# Patient Record
Sex: MALE | Race: BLACK OR AFRICAN AMERICAN | NOT HISPANIC OR LATINO | Employment: OTHER | URBAN - METROPOLITAN AREA
[De-identification: names, ages, dates, MRNs, and addresses within clinical notes are randomized per-mention and may not be internally consistent; named-entity substitution may affect disease eponyms.]

---

## 2019-03-28 ENCOUNTER — HOSPITAL ENCOUNTER (EMERGENCY)
Facility: HOSPITAL | Age: 39
Discharge: HOME | End: 2019-03-28
Attending: EMERGENCY MEDICINE
Payer: MEDICAID

## 2019-03-28 VITALS
HEART RATE: 96 BPM | TEMPERATURE: 99 F | BODY MASS INDEX: 34.1 KG/M2 | DIASTOLIC BLOOD PRESSURE: 101 MMHG | SYSTOLIC BLOOD PRESSURE: 184 MMHG | WEIGHT: 280 LBS | HEIGHT: 76 IN | RESPIRATION RATE: 18 BRPM | OXYGEN SATURATION: 98 %

## 2019-03-28 DIAGNOSIS — J01.90 ACUTE SINUSITIS, RECURRENCE NOT SPECIFIED, UNSPECIFIED LOCATION: Primary | ICD-10-CM

## 2019-03-28 DIAGNOSIS — H93.8X3 EAR PRESSURE, BILATERAL: ICD-10-CM

## 2019-03-28 DIAGNOSIS — R03.0 ELEVATED BLOOD-PRESSURE READING WITHOUT DIAGNOSIS OF HYPERTENSION: ICD-10-CM

## 2019-03-28 DIAGNOSIS — R51.9 SINUS HEADACHE: ICD-10-CM

## 2019-03-28 PROCEDURE — 99284 EMERGENCY DEPT VISIT MOD MDM: CPT | Mod: ,,, | Performed by: PHYSICIAN ASSISTANT

## 2019-03-28 PROCEDURE — 99284 EMERGENCY DEPT VISIT MOD MDM: CPT

## 2019-03-28 PROCEDURE — 99284 PR EMERGENCY DEPT VISIT,LEVEL IV: ICD-10-PCS | Mod: ,,, | Performed by: PHYSICIAN ASSISTANT

## 2019-03-28 RX ORDER — FLUTICASONE PROPIONATE 50 MCG
1 SPRAY, SUSPENSION (ML) NASAL 2 TIMES DAILY PRN
Qty: 15 G | Refills: 0 | Status: SHIPPED | OUTPATIENT
Start: 2019-03-28

## 2019-03-28 RX ORDER — AMOXICILLIN 875 MG/1
875 TABLET, FILM COATED ORAL 2 TIMES DAILY
Qty: 14 TABLET | Refills: 0 | Status: SHIPPED | OUTPATIENT
Start: 2019-03-28

## 2019-03-29 NOTE — ED PROVIDER NOTES
"Encounter Date: 3/28/2019       History     Chief Complaint   Patient presents with    Headache     C/o intermittent "all over" HAs since sunday. C/o subjective fevers.      The patient presents to the ER c/o bilateral frontal headache, sinus pressure, facial pain, and bilateral ear pressure. He states that he is unable to breathe through his nose. He states that he has felt hot at times and chilled other times and suspects that he may have had a fever last night, but he does not have a thermometer. He states that the symptoms started 4-5 days ago. He denies any head injury or trauma. He states that the headache pain is dull and aching. He states that the degree of pain is mild. He states that the congestion is constant. He states that the headache pain is waxing and waning. He denies any mitigating or exacerbating factors. He states that the headache onset was gradual over the course of several hours. He denies thunderclap or worst of life. He denies any pre-arrival treatment. He denies any additional symptoms. He states that he had similar symptoms while incarcerated 2 months ago, but was not able to seek medical attention due to being in [facility].          Review of patient's allergies indicates:  No Known Allergies  History reviewed. No pertinent past medical history.  History reviewed. No pertinent surgical history.  Family History   Problem Relation Age of Onset    Hypertension Mother      Social History     Tobacco Use    Smoking status: Never Smoker    Smokeless tobacco: Never Used   Substance Use Topics    Alcohol use: Yes    Drug use: Not Currently     Review of Systems   Constitutional: Positive for chills and fever. Negative for activity change, appetite change, diaphoresis and fatigue.   HENT: Positive for congestion, ear pain, rhinorrhea, sinus pressure, sinus pain and sneezing. Negative for dental problem, drooling, ear discharge, facial swelling, sore throat, tinnitus and trouble swallowing.  "   Eyes: Negative for photophobia, pain, redness and visual disturbance.   Respiratory: Negative for cough, chest tightness, shortness of breath, wheezing and stridor.    Cardiovascular: Negative for chest pain, palpitations and leg swelling.   Gastrointestinal: Negative for abdominal pain, blood in stool, diarrhea, nausea and vomiting.   Endocrine: Negative for polydipsia and polyuria.   Genitourinary: Negative for decreased urine volume and dysuria.   Musculoskeletal: Negative for arthralgias, gait problem, myalgias, neck pain and neck stiffness.   Skin: Negative for rash.   Neurological: Positive for headaches. Negative for dizziness, tremors, seizures, syncope, facial asymmetry, speech difficulty, weakness, light-headedness and numbness.   Psychiatric/Behavioral: Negative for confusion.       Physical Exam     Initial Vitals [03/28/19 2008]   BP Pulse Resp Temp SpO2   (!) 184/101 96 18 98.8 °F (37.1 °C) 98 %      MAP       --         Physical Exam    Nursing note and vitals reviewed.  Constitutional: He appears well-developed and well-nourished. He is not diaphoretic.   Alert and ambulatory. Non-toxic appearance. He does not appear to be in any significant distress presently.    HENT:   Head: Normocephalic and atraumatic.   Swollen nasal mucosa. Rhinorrhea. Bilateral TM bulging w/o erythema. No oropharyngeal swelling, erythema, or exudate. No adenopathy. Sinus tenderness.    Eyes: Conjunctivae are normal. Right eye exhibits no discharge. Left eye exhibits no discharge.   Neck: Normal range of motion. Neck supple.   Non-tender. No nuchal rigidity.    Cardiovascular: Normal rate and intact distal pulses.   Pulmonary/Chest: Breath sounds normal. No respiratory distress.   Abdominal: Soft. He exhibits no distension. There is no tenderness. There is no rebound and no guarding.   Musculoskeletal: Normal range of motion. He exhibits no edema.   Lymphadenopathy:     He has no cervical adenopathy.   Neurological: He is  alert and oriented to person, place, and time. He has normal strength. No sensory deficit.   Normal speech. Normal gait. No facial droop. No focal deficit.    Skin: Skin is warm and dry. No rash noted.   Psychiatric: He has a normal mood and affect. His behavior is normal.         ED Course   Procedures  Labs Reviewed - No data to display       Imaging Results    None          Medical Decision Making:   Initial Assessment:   Pt here c/o sinus congestion, ear pressure, and dull frontal headache for the past 4-5 days.   Differential Diagnosis:   Sinus Headache, Sinusitis, otitis, Pharyngitis, Tension HA, Migraine HA, Cluster HA, Hypertensive HA, CVA, Meningitis, Aneurysm, etc   ED Management:  I informed the patient that his blood pressure reading was significantly elevated today and I advised him to follow up closely with primary care to be properly evaluated for possible HTN or pre-HTN.     Symptoms, exam, and history consistent with sinus headache - will treat accordingly. Advised pt to return to the ER promptly if unimproved or if worse in any way.                       Clinical Impression:       ICD-10-CM ICD-9-CM   1. Acute sinusitis, recurrence not specified, unspecified location J01.90 461.9   2. Ear pressure, bilateral H93.8X3 388.8   3. Sinus headache R51 784.0   4. Elevated blood-pressure reading without diagnosis of hypertension R03.0 796.2         Disposition:   Disposition: Discharged  Condition: Stable                        Ray Chaves PA-C  03/29/19 1137

## 2019-03-29 NOTE — ED NOTES
Pt ao4, ambulatory and in NAD. Pt restated discharge instructions in his own words and demonstrated an understanding of medications prescribed. Pt exited ED w/o further incident.

## 2019-05-08 ENCOUNTER — HOSPITAL ENCOUNTER (EMERGENCY)
Facility: HOSPITAL | Age: 39
Discharge: HOME | End: 2019-05-08
Attending: EMERGENCY MEDICINE
Payer: MEDICAID

## 2019-05-08 VITALS
BODY MASS INDEX: 33.49 KG/M2 | HEIGHT: 76 IN | RESPIRATION RATE: 16 BRPM | TEMPERATURE: 98 F | DIASTOLIC BLOOD PRESSURE: 88 MMHG | HEART RATE: 81 BPM | WEIGHT: 275 LBS | SYSTOLIC BLOOD PRESSURE: 175 MMHG | OXYGEN SATURATION: 98 %

## 2019-05-08 DIAGNOSIS — R51.9 NONINTRACTABLE EPISODIC HEADACHE, UNSPECIFIED HEADACHE TYPE: Primary | ICD-10-CM

## 2019-05-08 DIAGNOSIS — I10 HYPERTENSION, UNSPECIFIED TYPE: ICD-10-CM

## 2019-05-08 PROCEDURE — 99284 EMERGENCY DEPT VISIT MOD MDM: CPT | Mod: ,,, | Performed by: PHYSICIAN ASSISTANT

## 2019-05-08 PROCEDURE — 99284 PR EMERGENCY DEPT VISIT,LEVEL IV: ICD-10-PCS | Mod: ,,, | Performed by: PHYSICIAN ASSISTANT

## 2019-05-08 PROCEDURE — 99283 EMERGENCY DEPT VISIT LOW MDM: CPT

## 2019-05-08 RX ORDER — AMLODIPINE BESYLATE 5 MG/1
5 TABLET ORAL DAILY
Qty: 30 TABLET | Refills: 0 | Status: SHIPPED | OUTPATIENT
Start: 2019-05-08 | End: 2019-06-07

## 2019-05-09 NOTE — ED PROVIDER NOTES
"Encounter Date: 5/8/2019       History     Chief Complaint   Patient presents with    Headache     C/o "pressure HA" and feeling like his "ears are on fire". Feels like he's running fever.      Mr Emerson is a 37 yo male patient that presents to the ED with intermittent headaches over the last three months. Pt also reports the sensation of his ears feeling warm. Pt reports that his headache has resolved, but is concerned about his BP. He was seen in ED two months and was told his BP was high and to follow up with PCP. Pt has not followed up with PCP. Pt denies any dizziness, light-headedness, double or blurred vision, chest pain, shortness of breath, abdominal pain, N/V/D, fevers, chills, body aches.         Review of patient's allergies indicates:  No Known Allergies  No past medical history on file.  No past surgical history on file.  Family History   Problem Relation Age of Onset    Hypertension Mother      Social History     Tobacco Use    Smoking status: Never Smoker    Smokeless tobacco: Never Used   Substance Use Topics    Alcohol use: Yes    Drug use: Not Currently     Review of Systems   Constitutional: Negative for chills and fever.   HENT: Negative for congestion, rhinorrhea and sore throat.    Eyes: Negative for photophobia, pain and visual disturbance.   Respiratory: Negative for cough and shortness of breath.    Cardiovascular: Negative for chest pain, palpitations and leg swelling.   Gastrointestinal: Negative for abdominal pain, diarrhea, nausea and vomiting.   Genitourinary: Negative for dysuria, flank pain and frequency.   Musculoskeletal: Negative for back pain, neck pain and neck stiffness.   Neurological: Positive for headaches. Negative for dizziness, syncope, facial asymmetry, speech difficulty, weakness, light-headedness and numbness.   Psychiatric/Behavioral: Negative for confusion.       Physical Exam     Initial Vitals [05/08/19 2206]   BP Pulse Resp Temp SpO2   (!) 175/88 81 16 98.4 " °F (36.9 °C) 98 %      MAP       --         Physical Exam    Constitutional: He appears well-developed and well-nourished. He is cooperative.  Non-toxic appearance. No distress.   HENT:   Head: Normocephalic and atraumatic.   Eyes: Conjunctivae and EOM are normal. Pupils are equal, round, and reactive to light.   Neck: Normal range of motion. Neck supple.   Cardiovascular: Normal rate. Exam reveals no gallop and no friction rub.    No murmur heard.  Pulmonary/Chest: Effort normal. No respiratory distress. He has no decreased breath sounds. He has no wheezes. He has no rhonchi. He has no rales.   Abdominal: Soft. There is no tenderness. There is no rigidity, no rebound, no guarding and no CVA tenderness.   Musculoskeletal: Normal range of motion.   Neurological: He is alert and oriented to person, place, and time. He has normal strength. No cranial nerve deficit or sensory deficit. Coordination and gait normal. GCS eye subscore is 4. GCS verbal subscore is 5. GCS motor subscore is 6.   5/5 strength BUE and BLE  Finger to nose normal  Heel to shin normal     Skin: Skin is warm and dry.         ED Course   Procedures  Labs Reviewed - No data to display       Imaging Results    None          Medical Decision Making:   Initial Assessment:   Mr Emerson is a 37 yo male patient that presents to the ED with intermittent headaches over the last three months. Pt also reports the sensation of his ears feeling warm. Pt reports that his headache has resolved, but is concerned about his BP. He was seen in ED two months and was told his BP was high and to follow up with PCP. Pt has not followed up with PCP. Pt denies any dizziness, light-headedness, double or blurred vision, chest pain, shortness of breath, abdominal pain, N/V/D, fevers, chills, body aches.   ED Management:  Pt hemodynamically stable. Pt is very well appearing, pleasant, conversational and in no acute distress. Pt's headache now resolved. BP elevated at 175/88. Pt  concerned about BP and requesting BP medication. He was told his BP was high during his last ED visit and instructed to f/u with PCP but never did. Plan is to discharge patient and have him follow up with PCP using resource sheet. Pt verbalizes understanding of plan. Return instructions given. He was discharged with new prescription of norvasc.  He will follow up with PCP.  All of the patient's questions were answered.  I reviewed the patient's chart, labs and discussed the case with my supervising physician.                       Clinical Impression:       ICD-10-CM ICD-9-CM   1. Nonintractable episodic headache, unspecified headache type R51 784.0   2. Hypertension, unspecified type I10 401.9         Disposition:   Disposition: Discharged  Condition: Stable                        Percy Rosado PA-C  05/09/19 0648

## 2019-05-09 NOTE — ED TRIAGE NOTES
"David Emerson, an 38 y.o. male presents to the ED stating "sometimes my ears be real hot and warm and I know somethin not right.  I only have like a slight headache and I'm worried something's real wrong with me. "  Patient reports taking amoxicillin for some bad teeth he needs to have work on and denies any other medical history.  Patient further reports congestion and stating that he's been eating a lot since he was released from [facility] in January      Review of patient's allergies indicates:  No Known Allergies  Chief Complaint   Patient presents with    Headache     C/o "pressure HA" and feeling like his "ears are on fire". Feels like he's running fever.      No past medical history on file.    "

## 2019-05-09 NOTE — DISCHARGE INSTRUCTIONS
Please take new medication as directed. Please make sure to follow up with PCP using resources provided to discuss today's Emergency Department visit and for further evaluation and management. Please return to the Emergency Department if your symptoms persist, worsen or you develop any additional concerning symptoms.

## 2021-01-05 ENCOUNTER — CLINICAL SUPPORT (OUTPATIENT)
Dept: URGENT CARE | Facility: CLINIC | Age: 41
End: 2021-01-05
Payer: MEDICAID

## 2021-01-05 DIAGNOSIS — Z11.9 ENCOUNTER FOR SCREENING EXAMINATION FOR INFECTIOUS DISEASE: Primary | ICD-10-CM

## 2021-01-05 LAB
CTP QC/QA: YES
SARS-COV-2 RDRP RESP QL NAA+PROBE: NEGATIVE

## 2021-01-05 PROCEDURE — U0002: ICD-10-PCS | Mod: QW,S$GLB,, | Performed by: FAMILY MEDICINE

## 2021-01-05 PROCEDURE — U0002 COVID-19 LAB TEST NON-CDC: HCPCS | Mod: QW,S$GLB,, | Performed by: FAMILY MEDICINE

## 2021-04-06 ENCOUNTER — OFFICE VISIT (OUTPATIENT)
Dept: URGENT CARE | Facility: CLINIC | Age: 41
End: 2021-04-06
Payer: MEDICAID

## 2021-04-06 VITALS
HEIGHT: 76 IN | SYSTOLIC BLOOD PRESSURE: 129 MMHG | HEART RATE: 81 BPM | WEIGHT: 278 LBS | OXYGEN SATURATION: 96 % | BODY MASS INDEX: 33.85 KG/M2 | DIASTOLIC BLOOD PRESSURE: 86 MMHG | TEMPERATURE: 99 F | RESPIRATION RATE: 16 BRPM

## 2021-04-06 DIAGNOSIS — U07.1 COVID-19 VIRUS DETECTED: ICD-10-CM

## 2021-04-06 DIAGNOSIS — Z03.818 ENCNTR FOR OBS FOR SUSP EXPSR TO OTH BIOLG AGENTS RULED OUT: ICD-10-CM

## 2021-04-06 DIAGNOSIS — Z11.52 ENCOUNTER FOR SCREENING FOR COVID-19: Primary | ICD-10-CM

## 2021-04-06 LAB
CTP QC/QA: YES
SARS-COV-2 RDRP RESP QL NAA+PROBE: POSITIVE

## 2021-04-06 PROCEDURE — U0002: ICD-10-PCS | Mod: QW,S$GLB,, | Performed by: FAMILY MEDICINE

## 2021-04-06 PROCEDURE — 99214 OFFICE O/P EST MOD 30 MIN: CPT | Mod: S$GLB,,, | Performed by: FAMILY MEDICINE

## 2021-04-06 PROCEDURE — U0002 COVID-19 LAB TEST NON-CDC: HCPCS | Mod: QW,S$GLB,, | Performed by: FAMILY MEDICINE

## 2021-04-06 PROCEDURE — 99214 PR OFFICE/OUTPT VISIT, EST, LEVL IV, 30-39 MIN: ICD-10-PCS | Mod: S$GLB,,, | Performed by: FAMILY MEDICINE

## 2021-04-06 RX ORDER — LORATADINE 10 MG/1
10 TABLET ORAL DAILY
Qty: 30 TABLET | Refills: 2 | Status: SHIPPED | OUTPATIENT
Start: 2021-04-06 | End: 2022-04-06

## 2022-02-22 ENCOUNTER — OFFICE VISIT (OUTPATIENT)
Dept: URGENT CARE | Facility: CLINIC | Age: 42
End: 2022-02-22
Payer: MEDICAID

## 2022-02-22 VITALS
TEMPERATURE: 98 F | OXYGEN SATURATION: 96 % | DIASTOLIC BLOOD PRESSURE: 74 MMHG | BODY MASS INDEX: 33.85 KG/M2 | RESPIRATION RATE: 16 BRPM | HEART RATE: 89 BPM | WEIGHT: 278 LBS | HEIGHT: 76 IN | SYSTOLIC BLOOD PRESSURE: 110 MMHG

## 2022-02-22 DIAGNOSIS — J32.9 SINUSITIS, UNSPECIFIED CHRONICITY, UNSPECIFIED LOCATION: ICD-10-CM

## 2022-02-22 DIAGNOSIS — R09.81 NASAL CONGESTION: Primary | ICD-10-CM

## 2022-02-22 LAB
CTP QC/QA: YES
SARS-COV-2 RDRP RESP QL NAA+PROBE: NEGATIVE

## 2022-02-22 PROCEDURE — 99213 OFFICE O/P EST LOW 20 MIN: CPT | Mod: S$GLB,,, | Performed by: PHYSICIAN ASSISTANT

## 2022-02-22 PROCEDURE — 1159F MED LIST DOCD IN RCRD: CPT | Mod: CPTII,S$GLB,, | Performed by: PHYSICIAN ASSISTANT

## 2022-02-22 PROCEDURE — 1159F PR MEDICATION LIST DOCUMENTED IN MEDICAL RECORD: ICD-10-PCS | Mod: CPTII,S$GLB,, | Performed by: PHYSICIAN ASSISTANT

## 2022-02-22 PROCEDURE — 3008F BODY MASS INDEX DOCD: CPT | Mod: CPTII,S$GLB,, | Performed by: PHYSICIAN ASSISTANT

## 2022-02-22 PROCEDURE — 99213 PR OFFICE/OUTPT VISIT, EST, LEVL III, 20-29 MIN: ICD-10-PCS | Mod: S$GLB,,, | Performed by: PHYSICIAN ASSISTANT

## 2022-02-22 PROCEDURE — 3074F SYST BP LT 130 MM HG: CPT | Mod: CPTII,S$GLB,, | Performed by: PHYSICIAN ASSISTANT

## 2022-02-22 PROCEDURE — 3078F DIAST BP <80 MM HG: CPT | Mod: CPTII,S$GLB,, | Performed by: PHYSICIAN ASSISTANT

## 2022-02-22 PROCEDURE — 3008F PR BODY MASS INDEX (BMI) DOCUMENTED: ICD-10-PCS | Mod: CPTII,S$GLB,, | Performed by: PHYSICIAN ASSISTANT

## 2022-02-22 PROCEDURE — U0002 COVID-19 LAB TEST NON-CDC: HCPCS | Mod: QW,S$GLB,, | Performed by: PHYSICIAN ASSISTANT

## 2022-02-22 PROCEDURE — 3074F PR MOST RECENT SYSTOLIC BLOOD PRESSURE < 130 MM HG: ICD-10-PCS | Mod: CPTII,S$GLB,, | Performed by: PHYSICIAN ASSISTANT

## 2022-02-22 PROCEDURE — 3078F PR MOST RECENT DIASTOLIC BLOOD PRESSURE < 80 MM HG: ICD-10-PCS | Mod: CPTII,S$GLB,, | Performed by: PHYSICIAN ASSISTANT

## 2022-02-22 PROCEDURE — U0002: ICD-10-PCS | Mod: QW,S$GLB,, | Performed by: PHYSICIAN ASSISTANT

## 2022-02-22 NOTE — PROGRESS NOTES
"Subjective:       Patient ID: David Emerson is a 41 y.o. male.    Vitals:  height is 6' 4" (1.93 m) and weight is 126.1 kg (278 lb). His temperature is 98.4 °F (36.9 °C). His blood pressure is 110/74 and his pulse is 89. His respiration is 16 and oxygen saturation is 96%.     Chief Complaint: Nasal Congestion    Pt presents loss of taste and smell and nasal congestion for two days    Patient provider note starts here:  Patient presents with 2 day history of rhinorrhea with associated nasal congestion.  Reports rhinorrhea and sneezing worse throughout the day and congestion at nighttime.  Reports he has been taking Zyrtec with little relief of his symptoms.  He presents here requesting a COVID-19 test to ensure he does not have COVID.  Denies being vaccinated against COVID-19.  Additionally denies fever, chest pain or shortness of breath.    Sinus Problem  This is a new problem. The current episode started in the past 7 days. The problem is unchanged. There has been no fever. His pain is at a severity of 0/10. Associated symptoms include congestion and sinus pressure. Pertinent negatives include no chills, coughing, ear pain, neck pain, shortness of breath or sore throat. Past treatments include nothing. The treatment provided no relief.       Constitution: Negative for chills and fever.   HENT: Positive for congestion, postnasal drip and sinus pressure. Negative for ear pain, ear discharge and sore throat.    Neck: Negative for neck pain and neck stiffness.   Cardiovascular: Negative for chest pain.   Respiratory: Negative for chest tightness, cough, shortness of breath and wheezing.    Gastrointestinal: Negative for abdominal pain, vomiting and diarrhea.   Musculoskeletal: Negative for pain.   Skin: Negative for rash and wound.   Allergic/Immunologic: Negative for itching.   Neurological: Negative for numbness and tingling.       Objective:      Physical Exam   Constitutional: He is oriented to person, place, and " time. He appears well-developed. He is cooperative.  Non-toxic appearance. He does not appear ill. No distress.   HENT:   Head: Normocephalic and atraumatic.   Ears:   Right Ear: Hearing, tympanic membrane, external ear and ear canal normal.   Left Ear: Hearing, tympanic membrane, external ear and ear canal normal.   Nose: Congestion present. No mucosal edema, rhinorrhea or nasal deformity. No epistaxis. Right sinus exhibits no maxillary sinus tenderness and no frontal sinus tenderness. Left sinus exhibits no maxillary sinus tenderness and no frontal sinus tenderness.   Mouth/Throat: Uvula is midline, oropharynx is clear and moist and mucous membranes are normal. No trismus in the jaw. Normal dentition. No uvula swelling. No oropharyngeal exudate, posterior oropharyngeal edema or posterior oropharyngeal erythema.   Eyes: Conjunctivae and lids are normal. No scleral icterus.   Neck: Trachea normal and phonation normal. Neck supple. No edema present. No erythema present. No neck rigidity present.   Cardiovascular: Normal rate, regular rhythm, normal heart sounds and normal pulses.   Pulmonary/Chest: Effort normal and breath sounds normal. No respiratory distress. He has no decreased breath sounds. He has no wheezes. He has no rhonchi.   Abdominal: Normal appearance.   Musculoskeletal: Normal range of motion.         General: No deformity. Normal range of motion.   Neurological: He is alert and oriented to person, place, and time. He exhibits normal muscle tone. Coordination normal.   Skin: Skin is warm, dry, intact, not diaphoretic and not pale.   Psychiatric: His speech is normal and behavior is normal. Judgment and thought content normal.   Nursing note and vitals reviewed.        Assessment:       1. Nasal congestion    2. Sinusitis, unspecified chronicity, unspecified location        Results for orders placed or performed in visit on 02/22/22   POCT COVID-19 Rapid Screening   Result Value Ref Range    POC Rapid  COVID Negative Negative     Acceptable Yes        Plan:         Nasal congestion  -     POCT COVID-19 Rapid Screening    Sinusitis, unspecified chronicity, unspecified location           Medical Decision Making:   History:   Old Medical Records: I decided to obtain old medical records.  Differential Diagnosis:   Differential diagnosis includes but is not limited to: viral vs bacterial URI, pharyngitis, otitis, COVID 19, influenza, pneumonia.    Clinical Tests:   Lab Tests: Ordered and Reviewed       <> Summary of Lab: COVID negative  Urgent Care Management:  Patient presents complaints of URI like symptoms for the past 2 days.  On exam, he is afebrile nontoxic appearing.  Lungs are clear to auscultation bilaterally.  Nasal congestion appreciated.  COVID negative. Advised Zyrtec D and also educated on the need to monitor BP. He verbalized understanding and agreed with plan.         Patient Instructions   You must understand that you've received an Urgent Care treatment only and that you may be released before all your medical problems are known or treated. You, the patient, will arrange for follow up care as instructed.      Follow up with your PCP or specialty clinic as instructed in the next 2-3 days if not improved or as needed. You can call [phone] to schedule an appointment with appropriate provider.      If you condition worsens, we recommend that you receive another evaluation at the emergency room immediately or contact your primary medical clinic's after hours call service to discuss your concerns.      Please return here or go to the Emergency Department for any concerns or worsening condition.      If you were prescribed a narcotic or controlled substance, do not drive or operate heavy equipment or machinery while taking these medications.

## 2022-02-22 NOTE — PATIENT INSTRUCTIONS
You must understand that you've received an Urgent Care treatment only and that you may be released before all your medical problems are known or treated. You, the patient, will arrange for follow up care as instructed.      Follow up with your PCP or specialty clinic as instructed in the next 2-3 days if not improved or as needed. You can call [phone] to schedule an appointment with appropriate provider.      If you condition worsens, we recommend that you receive another evaluation at the emergency room immediately or contact your primary medical clinic's after hours call service to discuss your concerns.      Please return here or go to the Emergency Department for any concerns or worsening condition.      If you were prescribed a narcotic or controlled substance, do not drive or operate heavy equipment or machinery while taking these medications.

## 2022-05-07 ENCOUNTER — OFFICE VISIT (OUTPATIENT)
Dept: URGENT CARE | Facility: CLINIC | Age: 42
End: 2022-05-07
Payer: MEDICAID

## 2022-05-07 VITALS
HEIGHT: 76 IN | OXYGEN SATURATION: 97 % | WEIGHT: 278 LBS | DIASTOLIC BLOOD PRESSURE: 83 MMHG | BODY MASS INDEX: 33.85 KG/M2 | TEMPERATURE: 98 F | RESPIRATION RATE: 16 BRPM | SYSTOLIC BLOOD PRESSURE: 117 MMHG | HEART RATE: 78 BPM

## 2022-05-07 DIAGNOSIS — R43.2 LOSS OF TASTE: Primary | ICD-10-CM

## 2022-05-07 LAB
CTP QC/QA: YES
SARS-COV-2 RDRP RESP QL NAA+PROBE: NEGATIVE

## 2022-05-07 PROCEDURE — U0002 COVID-19 LAB TEST NON-CDC: HCPCS | Mod: QW,S$GLB,,

## 2022-05-07 PROCEDURE — 3074F PR MOST RECENT SYSTOLIC BLOOD PRESSURE < 130 MM HG: ICD-10-PCS | Mod: CPTII,S$GLB,,

## 2022-05-07 PROCEDURE — 99212 PR OFFICE/OUTPT VISIT, EST, LEVL II, 10-19 MIN: ICD-10-PCS | Mod: S$GLB,CS,,

## 2022-05-07 PROCEDURE — 3079F PR MOST RECENT DIASTOLIC BLOOD PRESSURE 80-89 MM HG: ICD-10-PCS | Mod: CPTII,S$GLB,,

## 2022-05-07 PROCEDURE — U0002: ICD-10-PCS | Mod: QW,S$GLB,,

## 2022-05-07 PROCEDURE — 3008F PR BODY MASS INDEX (BMI) DOCUMENTED: ICD-10-PCS | Mod: CPTII,S$GLB,,

## 2022-05-07 PROCEDURE — 3079F DIAST BP 80-89 MM HG: CPT | Mod: CPTII,S$GLB,,

## 2022-05-07 PROCEDURE — 1160F PR REVIEW ALL MEDS BY PRESCRIBER/CLIN PHARMACIST DOCUMENTED: ICD-10-PCS | Mod: CPTII,S$GLB,,

## 2022-05-07 PROCEDURE — 1159F MED LIST DOCD IN RCRD: CPT | Mod: CPTII,S$GLB,,

## 2022-05-07 PROCEDURE — 3008F BODY MASS INDEX DOCD: CPT | Mod: CPTII,S$GLB,,

## 2022-05-07 PROCEDURE — 99212 OFFICE O/P EST SF 10 MIN: CPT | Mod: S$GLB,CS,,

## 2022-05-07 PROCEDURE — 1160F RVW MEDS BY RX/DR IN RCRD: CPT | Mod: CPTII,S$GLB,,

## 2022-05-07 PROCEDURE — 1159F PR MEDICATION LIST DOCUMENTED IN MEDICAL RECORD: ICD-10-PCS | Mod: CPTII,S$GLB,,

## 2022-05-07 PROCEDURE — 3074F SYST BP LT 130 MM HG: CPT | Mod: CPTII,S$GLB,,

## 2022-05-07 NOTE — PROGRESS NOTES
"Subjective:       Patient ID: David Emerson is a 41 y.o. male.    Vitals:  height is 6' 4" (1.93 m) and weight is 126.1 kg (278 lb). His temperature is 98.2 °F (36.8 °C). His blood pressure is 117/83 and his pulse is 78. His respiration is 16 and oxygen saturation is 97%.     Chief Complaint: COVID-19 Concerns    Pt stated that he lost his sense of taste and smell yesterday and he would like to be covid tested . Pt reports to no there symptoms . Pt pharmacy updated .       Constitution: Negative for activity change, appetite change, chills, sweating, fatigue and fever.   HENT: Positive for sinus pressure. Negative for ear pain, facial swelling, congestion and sore throat.         Reports sinus pressure that is chronic.    Cardiovascular: Negative for chest pain, leg swelling, palpitations, sob on exertion and passing out.   Respiratory: Negative for chest tightness, cough, sputum production, bloody sputum, shortness of breath, stridor and wheezing.    Gastrointestinal: Positive for diarrhea. Negative for abdominal pain, nausea, vomiting and constipation.        Reports Diarrhea today.   Musculoskeletal: Negative for pain and muscle ache.   Neurological: Positive for headaches. Negative for dizziness, history of vertigo, light-headedness and passing out.        Reports HA yesterday.       Objective:      Physical Exam   Constitutional: He is oriented to person, place, and time. He appears well-developed. He is cooperative.  Non-toxic appearance. He does not appear ill. No distress.   HENT:   Head: Normocephalic and atraumatic.   Ears:   Right Ear: Hearing, tympanic membrane, external ear and ear canal normal.   Left Ear: Hearing, tympanic membrane, external ear and ear canal normal.   Nose: Nose normal. No mucosal edema, rhinorrhea or nasal deformity. No epistaxis. Right sinus exhibits no maxillary sinus tenderness and no frontal sinus tenderness. Left sinus exhibits no maxillary sinus tenderness and no frontal " sinus tenderness.   Mouth/Throat: Uvula is midline, oropharynx is clear and moist and mucous membranes are normal. No trismus in the jaw. Normal dentition. No dental abscesses or uvula swelling. No oropharyngeal exudate, posterior oropharyngeal edema or posterior oropharyngeal erythema. Tonsils are 1+ on the right. Tonsils are 1+ on the left. No tonsillar exudate.   Eyes: Conjunctivae and lids are normal. No scleral icterus.   Neck: Trachea normal and phonation normal. Neck supple. No edema present. No erythema present. No neck rigidity present.   Cardiovascular: Normal rate, regular rhythm, S1 normal, S2 normal, normal heart sounds and normal pulses. Exam reveals no S3 and no S4.   Pulmonary/Chest: Effort normal and breath sounds normal. No accessory muscle usage or stridor. No apnea, no tachypnea and no bradypnea. No respiratory distress. He has no decreased breath sounds. He has no wheezes. He has no rhonchi. He has no rales.   Abdominal: Normal appearance.   Musculoskeletal: Normal range of motion.         General: No deformity. Normal range of motion.   Neurological: He is alert and oriented to person, place, and time. He exhibits normal muscle tone. Coordination normal.   Skin: Skin is warm, dry, intact, not diaphoretic and not pale.   Psychiatric: His speech is normal and behavior is normal. Judgment and thought content normal.   Nursing note and vitals reviewed.        Results for orders placed or performed in visit on 05/07/22   POCT COVID-19 Rapid Screening   Result Value Ref Range    POC Rapid COVID Negative Negative     Acceptable Yes      Assessment:       1. Loss of taste          Plan:     Discussed  test results with PT. Discussed, if condition worsens or fails to improve that PT receive another evaluation at the ER immediately or contact your PCP to discuss your concerns or return here. Also addressed is the use of Zyrtec, Claritin or Allegra for congestion and sinus pressure. Also  reviewed was the use of Flonase and to use as directed by medication label. Also discussed was rest and fluids as well as Tylenol or ibuprofen can also be used as directed for pain or fever. PT verbalized understanding and agreed with plan and diagnosis. PT ambulatory out of clinic, NAD.    Loss of taste  -     POCT COVID-19 Rapid Screening